# Patient Record
Sex: FEMALE | URBAN - METROPOLITAN AREA
[De-identification: names, ages, dates, MRNs, and addresses within clinical notes are randomized per-mention and may not be internally consistent; named-entity substitution may affect disease eponyms.]

---

## 2019-06-25 ENCOUNTER — TRANSFERRED RECORDS (OUTPATIENT)
Dept: HEALTH INFORMATION MANAGEMENT | Facility: CLINIC | Age: 59
End: 2019-06-25

## 2019-07-02 ENCOUNTER — TELEPHONE (OUTPATIENT)
Dept: ONCOLOGY | Facility: CLINIC | Age: 59
End: 2019-07-02

## 2019-07-02 NOTE — TELEPHONE ENCOUNTER
ONCOLOGY INTAKE: Records Information      APPT INFORMATION:  Referring provider:  Dr. Zaira Antunez MD  Referring provider s clinic:  [facility]  Reason for visit/diagnosis:  family history of colon cancer  Has patient been notified of appointment date and time?: no    RECORDS INFORMATION:  Were the records received with the referral (via [employer])? yes    ADDITIONAL INFORMATION:  Left message for patient, provided hours/phone number.  Will try again on 7/3/2019 if appointment is not scheduled.    Please forward referral to oncology CSS team upon scheduling appointment.

## 2019-07-02 NOTE — LETTER
"    July 3, 2019       TO: Chante Lopez  [address] [place] [address]         Dear MsRaciel Marcusalejandra,    Our records indicate that you have not scheduled an appointment for a consult with a genetic counselor, as recommended by Dr. Zaira Antunez MD. If you wish to schedule within [facility], we have several options to help you schedule your appointment:      Call [phone]    Visit our website at [email or web address] and click \"I Want To\" (in upper right) and select Request an Appointment    Request an appointment via [employer] at [email or web address]     If you have chosen to schedule elsewhere or if you have already made an appointment, please disregard this letter.    If you have any questions or concerns regarding the information above, please contact the CANCER RISK MANAGEMENT PROGRAM at [phone].      Sincerely,      [facility]K [facility]NAG[facility]      "

## 2019-07-08 NOTE — TELEPHONE ENCOUNTER
ONCOLOGY INTAKE: Records Information      APPT INFORMATION: [id number] Naye Puente  Referring provider:  Dr. Zaira Antunez  Referring provider s clinic:  [facility]  Reason for visit/diagnosis:  Family History of Colon Cancer  Has patient been notified of appointment date and time?: Yes    RECORDS INFORMATION:  Were the records received with the referral (via [employer])? Yes    Has patient been seen for any external appt for this diagnosis? Yes    If yes, where? [facility]    Has patient had any imaging or procedures outside of [place] for this condition? Yes      If Yes, where? [facility]    ADDITIONAL INFORMATION:  Records received from [facility]

## 2019-07-08 NOTE — TELEPHONE ENCOUNTER
RECORDS STATUS - ALL OTHER DIAGNOSIS      RECORDS RECEIVED FROM: [facility]  ([facility])   DATE RECEIVED: 8/27/19   NOTES STATUS DETAILS   OFFICE NOTE from referring provider Complete  Dr. Zaira Antunez MD ([facility]) Referral 7/2/19   Office Visit 6/25/19   OFFICE NOTE from medical oncologist N/A    OPERATIVE REPORT N/A    LABS     PATHOLOGY REPORTS Request  Pathology Reports 2/15/18 related to the colon    ANYTHING RELATED TO DIAGNOSIS See Above     GENONOMIC TESTING     TYPE: N/A    IMAGING (NEED IMAGES & REPORT)     CT SCANS N/A    MRI     MAMMO     ULTRASOUND     PET       Action    Action Taken 7/8/19 10am     Called [facility] to check on pathology reports. Will need to get an TORI from pt. Fax [facility] back Fax: [phone] to request colon bx report.     Called pt to request TORI for [facility]. Left a vm requesting a call back.     Received a call back from the pt. Email address for TORI is: [email or web address] Sent an email to the pt.        Action    Action Taken 8/8/2019 2:45pm     Called pt back to follow up on TORI form. Left a vm requesting a call back.      Action    Action Taken 8/14/2019   Called Pt to check on TORI for [facility]. I see that we have access to her MR in [place] now. Pt mentioned that she would like a call back about her insurance. I will have someone from the scheduling team reach out to her about that. I sent the  Yordan Guillen an in-basket message requesting that he reach out to pt Chante.     3:45pm   CT IMG from 2015 at [facility]      Action    Action Taken 8/20/2019 11:38am     I followed up with Yordan Guillen about reaching out to pt Chante Lopez. She has some insurance questions.

## 2019-07-09 ENCOUNTER — TELEPHONE (OUTPATIENT)
Dept: CONSULT | Facility: CLINIC | Age: 59
End: 2019-07-09

## 2019-08-26 ENCOUNTER — TELEPHONE (OUTPATIENT)
Dept: ONCOLOGY | Facility: CLINIC | Age: 59
End: 2019-08-26

## 2019-08-26 NOTE — TELEPHONE ENCOUNTER
ONCOLOGY INTAKE: Records Information      APPT INFORMATION: 10/2/19 - Naye BIGGS  Referring provider:  Dr. Zaira Antunez  Referring provider s clinic:  [facility]  Reason for visit/diagnosis:  Family History of Colon Cancer  Has patient been notified of appointment date and time?: Yes    RECORDS INFORMATION:  Were the records received with the referral (via [employer])? Yes    Has patient been seen for any external appt for this diagnosis? Yes    If yes, where? [facility]    Has patient had any imaging or procedures outside of [place] for this condition? Yes      If Yes, where? [facility]    ADDITIONAL INFORMATION:  Records received from [facility]

## 2019-08-27 ENCOUNTER — PRE VISIT (OUTPATIENT)
Dept: ONCOLOGY | Facility: CLINIC | Age: 59
End: 2019-08-27